# Patient Record
Sex: MALE | Race: WHITE | NOT HISPANIC OR LATINO | Employment: OTHER | URBAN - METROPOLITAN AREA
[De-identification: names, ages, dates, MRNs, and addresses within clinical notes are randomized per-mention and may not be internally consistent; named-entity substitution may affect disease eponyms.]

---

## 2017-07-10 ENCOUNTER — HOSPITAL ENCOUNTER (EMERGENCY)
Facility: HOSPITAL | Age: 62
Discharge: HOME | End: 2017-07-10
Attending: EMERGENCY MEDICINE | Admitting: EMERGENCY MEDICINE

## 2017-07-10 ENCOUNTER — APPOINTMENT (OUTPATIENT)
Dept: GENERAL RADIOLOGY | Facility: HOSPITAL | Age: 62
End: 2017-07-10

## 2017-07-10 VITALS
DIASTOLIC BLOOD PRESSURE: 90 MMHG | HEART RATE: 61 BPM | SYSTOLIC BLOOD PRESSURE: 139 MMHG | RESPIRATION RATE: 16 BRPM | HEIGHT: 73 IN | OXYGEN SATURATION: 95 % | BODY MASS INDEX: 29.42 KG/M2 | TEMPERATURE: 98 F | WEIGHT: 222 LBS

## 2017-07-10 DIAGNOSIS — S91.115A: Primary | ICD-10-CM

## 2017-07-10 PROCEDURE — 90471 IMMUNIZATION ADMIN: CPT | Performed by: EMERGENCY MEDICINE

## 2017-07-10 PROCEDURE — 25010000002 TDAP 5-2.5-18.5 LF-MCG/0.5 SUSPENSION: Performed by: EMERGENCY MEDICINE

## 2017-07-10 PROCEDURE — 73660 X-RAY EXAM OF TOE(S): CPT

## 2017-07-10 PROCEDURE — 99282 EMERGENCY DEPT VISIT SF MDM: CPT

## 2017-07-10 PROCEDURE — 90715 TDAP VACCINE 7 YRS/> IM: CPT | Performed by: EMERGENCY MEDICINE

## 2017-07-10 PROCEDURE — 12001 RPR S/N/AX/GEN/TRNK 2.5CM/<: CPT | Performed by: EMERGENCY MEDICINE

## 2017-07-10 RX ORDER — FINASTERIDE 5 MG/1
5 TABLET, FILM COATED ORAL DAILY
COMMUNITY

## 2017-07-10 RX ORDER — SIMVASTATIN 20 MG
20 TABLET ORAL NIGHTLY
COMMUNITY

## 2017-07-10 RX ORDER — TAMSULOSIN HYDROCHLORIDE 0.4 MG/1
1 CAPSULE ORAL DAILY
COMMUNITY

## 2017-07-10 RX ORDER — LOSARTAN POTASSIUM 50 MG/1
100 TABLET ORAL DAILY
COMMUNITY

## 2017-07-10 RX ADMIN — TETANUS TOXOID, REDUCED DIPHTHERIA TOXOID AND ACELLULAR PERTUSSIS VACCINE, ADSORBED 0.5 ML: 5; 2.5; 8; 8; 2.5 SUSPENSION INTRAMUSCULAR at 13:12

## 2017-07-10 NOTE — ED PROVIDER NOTES
Subjective     History provided by:  Patient and spouse    History of Present Illness    · Chief complaint: Toe injury    · Location: Left third toe adjacent to the left fourth toe    · Quality/Severity: Laceration with bleeding    · Timing/Onset: Patient stepped on a piece cerebral oral his left foot wearing sandals and a piece of areolar when up between his left third and fourth toes lacerating the lateral aspect of the left third toe.    · Modifying Factors: Bleeding stopped with pressure.    · Associated symptoms: None    · Narrative: The patient is a 61-year-old white male who is wearing sandals when he stepped on a betsy piece cerebral are that went between his left third and fourth toe lacerating the lateral aspect of the left third toe.    ED Triage Vitals   Temp Heart Rate Resp BP SpO2   07/10/17 1252 07/10/17 1252 07/10/17 1252 07/10/17 1252 07/10/17 1252   98 °F (36.7 °C) 61 16 139/90 95 %      Temp src Heart Rate Source Patient Position BP Location FiO2 (%)   -- -- -- -- --              Review of Systems   Constitutional: Negative for fever.   Gastrointestinal: Negative for abdominal pain.   Musculoskeletal: Negative for back pain, gait problem and myalgias.   Skin: Positive for wound (left third toe). Negative for color change, pallor and rash.   Neurological: Negative for weakness and numbness.   Hematological: Negative for adenopathy.       Past Medical History:   Diagnosis Date   • Hyperlipidemia    • Hypertension    • Prostate enlargement        Allergies   Allergen Reactions   • Nsaids    • Penicillins        Past Surgical History:   Procedure Laterality Date   • KNEE ARTHROPLASTY, PARTIAL REPLACEMENT         History reviewed. No pertinent family history.    Social History     Social History   • Marital status:      Spouse name: N/A   • Number of children: N/A   • Years of education: N/A     Social History Main Topics   • Smoking status: Never Smoker   • Smokeless tobacco: None   • Alcohol  use 0.6 oz/week     1 Cans of beer per week      Comment: 3 nights a week   • Drug use: No   • Sexual activity: Not Asked     Other Topics Concern   • None     Social History Narrative   • None           Objective   Physical Exam   Constitutional: He is oriented to person, place, and time. He appears well-developed and well-nourished. No distress.   HENT:   Head: Normocephalic and atraumatic.   Musculoskeletal: Normal range of motion. He exhibits no edema.   The patient is a laceration on the lateral aspect of the left third toe without visible foreign body.  There is no bony deformity left third toe is neurovascularly intact.   Neurological: He is alert and oriented to person, place, and time. No cranial nerve deficit.   No focal motor sensory deficit.   Skin: Skin is warm and dry. No rash noted. He is not diaphoretic. No erythema. No pallor.   Psychiatric: His behavior is normal. Judgment and thought content normal.   Nursing note and vitals reviewed.      Laceration Repair  Date/Time: 7/10/2017 2:25 PM  Performed by: ENRIQUE BROWN.  Authorized by: ENRIQUE BROWN.   Consent given by: patient  Patient understanding: patient states understanding of the procedure being performed  Body area: lower extremity  Location details: left third toe  Laceration length: 1.5 cm  Foreign bodies: no foreign bodies  Tendon involvement: none  Nerve involvement: none  Vascular damage: no  Anesthesia: local infiltration    Anesthesia:  Anesthesia: local infiltration  Local Anesthetic: lidocaine 2% without epinephrine and NaHCO3 (sodium bicarbonate)   Anesthetic total: 1 mL  Sedation:  Patient sedated: no    Preparation: Patient was prepped and draped in the usual sterile fashion.  Irrigation solution: saline  Irrigation method: jet lavage  Amount of cleaning: standard  Debridement: none  Degree of undermining: none  Skin closure: 5-0 nylon  Number of sutures: 3  Approximation: close  Approximation difficulty: simple  Dressing:  antibiotic ointment (Band-Aid)  Patient tolerance: Patient tolerated the procedure well with no immediate complications               ED Course  ED Course   Comment By Time   Tdap 0.5cc IM Krzysztof Barron MD 07/10 1311                  MDM  Number of Diagnoses or Management Options  Laceration of third toe, left, initial encounter: new and requires workup     Amount and/or Complexity of Data Reviewed  Tests in the radiology section of CPT®: ordered and reviewed  Independent visualization of images, tracings, or specimens: yes    Risk of Complications, Morbidity, and/or Mortality  Presenting problems: moderate  Diagnostic procedures: low  Management options: moderate    Patient Progress  Patient progress: improved      Final diagnoses:   Laceration of third toe, left, initial encounter           Labs Reviewed - No data to display  XR Toe 2+ View Left   ED Interpretation   No fracture or foreign body      Final Result   1. No radiopaque soft tissue foreign body.   2. Degenerative arthropathy at the first MTP joint.       This report was finalized on 7/10/2017 2:06 PM by Dr. Junior Hinojosa MD.                 Medication List      Notice     No changes were made to your prescriptions during this visit.             Krzysztof Barron MD  07/10/17 2846